# Patient Record
Sex: FEMALE | Race: BLACK OR AFRICAN AMERICAN | URBAN - METROPOLITAN AREA
[De-identification: names, ages, dates, MRNs, and addresses within clinical notes are randomized per-mention and may not be internally consistent; named-entity substitution may affect disease eponyms.]

---

## 2023-04-12 ENCOUNTER — OFFICE VISIT (OUTPATIENT)
Dept: URBAN - METROPOLITAN AREA CLINIC 74 | Facility: CLINIC | Age: 43
End: 2023-04-12
Payer: COMMERCIAL

## 2023-04-12 ENCOUNTER — LAB OUTSIDE AN ENCOUNTER (OUTPATIENT)
Dept: URBAN - METROPOLITAN AREA CLINIC 74 | Facility: CLINIC | Age: 43
End: 2023-04-12

## 2023-04-12 ENCOUNTER — WEB ENCOUNTER (OUTPATIENT)
Dept: URBAN - METROPOLITAN AREA CLINIC 74 | Facility: CLINIC | Age: 43
End: 2023-04-12

## 2023-04-12 VITALS
DIASTOLIC BLOOD PRESSURE: 70 MMHG | HEART RATE: 78 BPM | WEIGHT: 188 LBS | SYSTOLIC BLOOD PRESSURE: 116 MMHG | BODY MASS INDEX: 33.31 KG/M2 | TEMPERATURE: 97.7 F | OXYGEN SATURATION: 99 % | HEIGHT: 63 IN

## 2023-04-12 DIAGNOSIS — K59.04 CHRONIC IDIOPATHIC CONSTIPATION: ICD-10-CM

## 2023-04-12 DIAGNOSIS — K63.89 EPIPLOIC APPENDAGITIS: ICD-10-CM

## 2023-04-12 DIAGNOSIS — K62.5 RECTAL BLEED: ICD-10-CM

## 2023-04-12 PROBLEM — 82934008: Status: ACTIVE | Noted: 2023-04-12

## 2023-04-12 PROCEDURE — 99204 OFFICE O/P NEW MOD 45 MIN: CPT | Performed by: INTERNAL MEDICINE

## 2023-04-12 RX ORDER — LINACLOTIDE 72 UG/1
1 CAPSULE, GELATIN COATED ORAL ONCE A DAY
Qty: 90 | Refills: 3 | OUTPATIENT
Start: 2023-04-12 | End: 2024-04-06

## 2023-04-12 NOTE — HPI-TODAY'S VISIT:
Pt seen in ER 3/19/23 for abd pain, CT showed epiploic appendigitis vs colitis. CBC/CMP/Lipase/UA/Preg negative or normal. ===== Results for orders placed or performed during the hospital encounter of 03/19/23 CT Abdomen/Pelvis with IV Contrast(Creatinine draw if needed)  	Narrative  	EXAM:  PH CT ABDOMEN/PELVIS WITH IV CONTRAST(CREATININE DRAW IF NEEDED)   CLINICAL INDICATION: LLQ pain.   TECHNIQUE: Following IV administration of 99 mL Omnipaque 350, CT scan of the abdomen and pelvis with multiplanar reformatted images generated from the data set. Dose reduction techniques were utilized.    COMPARISON: Pelvic ultrasound 12/1/2022, CT abdomen/pelvis 10/24/2022   FINDINGS:    Lower chest: No evidence of abnormality.   Liver: Normal in appearance.   Gallbladder: Normal.    Pancreas: Normal.    Spleen: Normal.   Adrenals: Normal.   Kidneys/urinary bladder: Normal.    Gastrointestinal: Normal caliber bowel. No bowel obstruction. Very mild inflammatory changes along the distal descending colon surrounding a rounded area of focal fat potentially representing mild epiploic appendagitis (series 201 images 78-84, series  202 images 34-37). Multiple thickening of the proximal to mid descending colon may be due to underdistention. The suspected appendix appears unremarkable.   Pelvis: Leiomyomatous uterus. Possible left adnexal crenated cyst measuring up to 1.6 cm (series 201 image 90) versus a fundal fibroid. Small volume free fluid within the pelvis is noted.   Vasculature: Abdominal and pelvic vasculature appears normal.   Peritoneum/retroperitoneum: No enlarged lymph nodes. No pneumoperitoneum. Mild nonspecific mesenteric haziness within the mid abdomen.   Bones: No destructive osseous lesion.      	Impression  	.         . 1.  Very mild inflammatory changes along the distal descending colon surrounding an area of focal fat potentially representing mild epiploic appendagitis . 2.  Small volume pelvic free fluid may be physiologic. Leiomyomatous uterus.   Released By: JARAD WERNER MD 3/19/2023 2:46 PM       Procedures       Assessment: Impression: 42 y.o. female presents to ED w/ LLQ pain.  Tenderness to palpation.  Diverticulitis v colitis v less likely GYN/urinary etiology. -BMP, CBC, UA -CT ab pelvis -reassess.    Interpreted CT scan:  LLQ inflammatory process adjacent to colon.     Labs reassuring.     Pt to follow up w/ PCP.         Medications Administered in ED    Medications ketorolac (TORADOL) injection 30 mg/mL (15 mg Intravenous Given 3/19/23 1316) dicyclomine (BENTYL) injection 10 mg/mL (20 mg Intramuscular Given 3/19/23 1316) iohexol (OMNIPAQUE) injection 350 mg/mL (99 mL Intravenous Given 3/19/23 1336) sodium chloride (NS) 0.9 % infusion (  Override Pull 3/19/23 1332) diphenhydrAMINE (BENADRYL) injection 50 mg/mL (50 mg Intravenous Given 3/19/23 1351)       ED Final Impression                Final diagnoses: Epiploic appendagitis

## 2023-04-13 ENCOUNTER — TELEPHONE ENCOUNTER (OUTPATIENT)
Dept: URBAN - METROPOLITAN AREA CLINIC 74 | Facility: CLINIC | Age: 43
End: 2023-04-13

## 2023-04-13 RX ORDER — PLECANATIDE 3 MG/1
1 TABLET TABLET ORAL ONCE A DAY
Qty: 90 | Refills: 3 | OUTPATIENT
Start: 2023-04-13 | End: 2024-04-07

## 2023-04-21 ENCOUNTER — TELEPHONE ENCOUNTER (OUTPATIENT)
Dept: URBAN - METROPOLITAN AREA CLINIC 74 | Facility: CLINIC | Age: 43
End: 2023-04-21

## 2023-04-28 ENCOUNTER — TELEPHONE ENCOUNTER (OUTPATIENT)
Dept: URBAN - METROPOLITAN AREA CLINIC 74 | Facility: CLINIC | Age: 43
End: 2023-04-28

## 2023-05-30 ENCOUNTER — OFFICE VISIT (OUTPATIENT)
Dept: URBAN - METROPOLITAN AREA SURGERY CENTER 30 | Facility: SURGERY CENTER | Age: 43
End: 2023-05-30

## 2023-06-21 ENCOUNTER — WEB ENCOUNTER (OUTPATIENT)
Dept: URBAN - METROPOLITAN AREA SURGERY CENTER 30 | Facility: SURGERY CENTER | Age: 43
End: 2023-06-21

## 2023-06-27 ENCOUNTER — OFFICE VISIT (OUTPATIENT)
Dept: URBAN - METROPOLITAN AREA SURGERY CENTER 30 | Facility: SURGERY CENTER | Age: 43
End: 2023-06-27
Payer: COMMERCIAL

## 2023-06-27 DIAGNOSIS — K59.09 COLONIC CONSTIPATION: ICD-10-CM

## 2023-06-27 DIAGNOSIS — R10.84 ABDOMINAL CRAMPING, GENERALIZED: ICD-10-CM

## 2023-06-27 PROCEDURE — G8907 PT DOC NO EVENTS ON DISCHARG: HCPCS | Performed by: INTERNAL MEDICINE

## 2023-06-27 PROCEDURE — 45378 DIAGNOSTIC COLONOSCOPY: CPT | Performed by: INTERNAL MEDICINE

## 2023-06-28 ENCOUNTER — OFFICE VISIT (OUTPATIENT)
Dept: URBAN - METROPOLITAN AREA CLINIC 74 | Facility: CLINIC | Age: 43
End: 2023-06-28

## 2023-07-19 ENCOUNTER — OFFICE VISIT (OUTPATIENT)
Dept: URBAN - METROPOLITAN AREA CLINIC 74 | Facility: CLINIC | Age: 43
End: 2023-07-19

## 2023-07-19 RX ORDER — PLECANATIDE 3 MG/1
1 TABLET TABLET ORAL ONCE A DAY
Qty: 90 | Refills: 3 | Status: ACTIVE | COMMUNITY
Start: 2023-04-13 | End: 2024-04-07

## 2023-07-19 RX ORDER — LINACLOTIDE 72 UG/1
1 CAPSULE, GELATIN COATED ORAL ONCE A DAY
Qty: 90 | Refills: 3 | Status: ACTIVE | COMMUNITY
Start: 2023-04-12 | End: 2024-04-06

## 2023-07-19 RX ORDER — LINACLOTIDE 72 UG/1
1 CAPSULE, GELATIN COATED ORAL ONCE A DAY
Qty: 90 | Refills: 3 | OUTPATIENT

## 2023-07-19 NOTE — HPI-TODAY'S VISIT:
Follow up colonoscopy. Colonoscopy 6/23 negative, small hemorrhoids noted and not banded. Rx: Trulance, started last visit. Pt seen in ER 3/19/23 for abd pain, CT showed epiploic appendigitis vs colitis. CBC/CMP/Lipase/UA/Preg negative or normal. =====

## 2023-09-27 ENCOUNTER — TELEPHONE ENCOUNTER (OUTPATIENT)
Dept: URBAN - METROPOLITAN AREA CLINIC 74 | Facility: CLINIC | Age: 43
End: 2023-09-27

## 2023-12-14 ENCOUNTER — LAB OUTSIDE AN ENCOUNTER (OUTPATIENT)
Dept: URBAN - METROPOLITAN AREA CLINIC 74 | Facility: CLINIC | Age: 43
End: 2023-12-14

## 2023-12-14 ENCOUNTER — OFFICE VISIT (OUTPATIENT)
Dept: URBAN - METROPOLITAN AREA CLINIC 74 | Facility: CLINIC | Age: 43
End: 2023-12-14
Payer: COMMERCIAL

## 2023-12-14 VITALS
HEART RATE: 74 BPM | WEIGHT: 162 LBS | TEMPERATURE: 96.8 F | HEIGHT: 63 IN | BODY MASS INDEX: 28.7 KG/M2 | DIASTOLIC BLOOD PRESSURE: 74 MMHG | SYSTOLIC BLOOD PRESSURE: 132 MMHG

## 2023-12-14 DIAGNOSIS — Z92.89 HISTORY OF VENTRICULOPERITONEAL SHUNTING: ICD-10-CM

## 2023-12-14 DIAGNOSIS — R10.31 RLQ ABDOMINAL PAIN: ICD-10-CM

## 2023-12-14 DIAGNOSIS — K59.04 CHRONIC IDIOPATHIC CONSTIPATION: ICD-10-CM

## 2023-12-14 DIAGNOSIS — K63.89 EPIPLOIC APPENDAGITIS: ICD-10-CM

## 2023-12-14 PROCEDURE — 99213 OFFICE O/P EST LOW 20 MIN: CPT | Performed by: PHYSICIAN ASSISTANT

## 2023-12-14 RX ORDER — LINACLOTIDE 72 UG/1
1 CAPSULE, GELATIN COATED ORAL ONCE A DAY
Qty: 90 | Refills: 3 | Status: ACTIVE | COMMUNITY

## 2023-12-14 RX ORDER — PLECANATIDE 3 MG/1
1 TABLET TABLET ORAL ONCE A DAY
Qty: 90 | Refills: 3 | Status: ACTIVE | COMMUNITY
Start: 2023-04-13 | End: 2024-04-07

## 2023-12-14 NOTE — PHYSICAL EXAM GASTROINTESTINAL
Abdomen , soft, tender at RLQ and pelvic region, nondistended , no guarding or rigidity , no masses palpable , normal bowel sounds , Liver and Spleen,  no hepatosplenomegaly , liver nontender

## 2023-12-18 ENCOUNTER — OFFICE VISIT (OUTPATIENT)
Dept: URBAN - METROPOLITAN AREA CLINIC 40 | Facility: CLINIC | Age: 43
End: 2023-12-18

## 2024-01-11 ENCOUNTER — OFFICE VISIT (OUTPATIENT)
Dept: URBAN - METROPOLITAN AREA CLINIC 74 | Facility: CLINIC | Age: 44
End: 2024-01-11

## 2024-01-11 RX ORDER — PLECANATIDE 3 MG/1
1 TABLET TABLET ORAL ONCE A DAY
Qty: 90 | Refills: 3 | Status: ACTIVE | COMMUNITY
Start: 2023-04-13 | End: 2024-04-07

## 2024-01-11 RX ORDER — LINACLOTIDE 72 UG/1
1 CAPSULE, GELATIN COATED ORAL ONCE A DAY
Qty: 90 | Refills: 3 | Status: ACTIVE | COMMUNITY

## 2024-01-11 NOTE — HPI-TODAY'S VISIT:
The patient is 43-year-old female with past medical history as noted below known to Dr. Robbins is presenting to our clinic today to discuss her imaging results.    -- The patient denies dyspepsia, dysphagia, odynophagia, hemoptysis, hematemesis, nausea, vomiting, regurgitation, melena, constipation, diarrhea, abdominal pain, hematochezia, fever, chills, chest pain, SOB, or any other GI complaints today.  -- The patient denies ETOH, Tobacco, and Illicit drug use.  -- The patient is up to date with Flu and COVID vaccine.  -- Denies NSAID's.    Diagnsotic studies: -- TV US on 12/18/2023 with anteverted uterus contains three fibroids. Endometrium measures 3 mm. Bilateral ovaries are visualized and demonstrate normal blood flow. Large volume pelvic free fluid measures up to 6.7 cm.   -- CT scan on 11/02/2023 with no evidence of abnormality.   -- Labs 11/02/2023 CBC, CMP, and UA unremarkable. Negative UA pregnancy test.    Procedure: -- Colonoscopy on 06/27/2023 by Dr. Robbins noted the entire examined colon is normal.  Internal hemorrhoids.  No specimen collected.

## 2024-01-16 ENCOUNTER — TELEPHONE ENCOUNTER (OUTPATIENT)
Dept: URBAN - METROPOLITAN AREA CLINIC 74 | Facility: CLINIC | Age: 44
End: 2024-01-16

## 2024-02-01 PROBLEM — 54554009: Status: ACTIVE | Noted: 2024-02-01

## 2024-02-02 ENCOUNTER — OV EP (OUTPATIENT)
Dept: URBAN - METROPOLITAN AREA CLINIC 74 | Facility: CLINIC | Age: 44
End: 2024-02-02
Payer: COMMERCIAL

## 2024-02-02 VITALS
SYSTOLIC BLOOD PRESSURE: 134 MMHG | TEMPERATURE: 97.7 F | HEIGHT: 63 IN | BODY MASS INDEX: 29.8 KG/M2 | WEIGHT: 168.2 LBS | HEART RATE: 72 BPM | OXYGEN SATURATION: 97 %

## 2024-02-02 DIAGNOSIS — Z92.89 HISTORY OF VENTRICULOPERITONEAL SHUNTING: ICD-10-CM

## 2024-02-02 DIAGNOSIS — K63.89 EPIPLOIC APPENDAGITIS: ICD-10-CM

## 2024-02-02 DIAGNOSIS — K59.04 CHRONIC IDIOPATHIC CONSTIPATION: ICD-10-CM

## 2024-02-02 DIAGNOSIS — Q45.3 PANCREAS DIVISUM: ICD-10-CM

## 2024-02-02 PROCEDURE — 99213 OFFICE O/P EST LOW 20 MIN: CPT | Performed by: PHYSICIAN ASSISTANT

## 2024-02-02 RX ORDER — PLECANATIDE 3 MG/1
1 TABLET TABLET ORAL ONCE A DAY
Qty: 90 | Refills: 3 | Status: ACTIVE | COMMUNITY
Start: 2023-04-13 | End: 2024-04-07

## 2024-02-02 RX ORDER — LINACLOTIDE 72 UG/1
1 CAPSULE, GELATIN COATED ORAL ONCE A DAY
Qty: 90 | Refills: 3 | Status: DISCONTINUED | COMMUNITY

## 2024-02-02 NOTE — HPI-TODAY'S VISIT:
The patient is 43-year-old female with past medical history as noted below known to Dr. Robbins is presenting to our clinic today to discuss her imaging results. The patient denies any GI isuues today.     -- The patient denies dyspepsia, dysphagia, odynophagia, hemoptysis, hematemesis, nausea, vomiting, regurgitation, melena, constipation, diarrhea, abdominal pain, hematochezia, fever, chills, chest pain, SOB, or any other GI complaints today.  -- The patient denies ETOH, Tobacco, and Illicit drug use.  -- The patient is up to date with Flu and COVID vaccine.  -- Denies NSAID's.    Diagnsotic studies: -- MRe on 01/29/2024 with no findings of active inflammatory bowel disease. A fibroid is seen at the uterine fundus posteriorly measuring 2 cm. Other uterine fibroids are also noted. Pancreas divisum is noted. No main pancreatic duct dilation. No segments of bowel wall thickening or mucosal hyperenhancement to indicate active inflammatory bowel disease.  No disproportionate dilation of the small or large bowel.  No stricture, fistula, sinus tract, or abscess identified.  Motility appears normal.   -- TV US on 12/18/2023 with anteverted uterus contains three fibroids. Endometrium measures 3 mm. Bilateral ovaries are visualized and demonstrate normal blood flow. Large volume pelvic free fluid measures up to 6.7 cm.   Procedure: -- Colonoscopy on 06/27/2023 by Dr. Robbins noted the entire examined colon is normal.  Internal hemorrhoids.  No specimen collected.

## 2024-02-03 LAB
ABSOLUTE BASOPHILS: 11
ABSOLUTE EOSINOPHILS: 19
ABSOLUTE LYMPHOCYTES: 1805
ABSOLUTE MONOCYTES: 296
ABSOLUTE NEUTROPHILS: 1668
ALBUMIN/GLOBULIN RATIO: 1.6
ALBUMIN: 4.3
ALKALINE PHOSPHATASE: 77
ALT: 8
AMYLASE: 68
AST: 12
BASOPHILS: 0.3
BILIRUBIN, TOTAL: 0.6
BUN/CREATININE RATIO: (no result)
CALCIUM: 9.4
CARBON DIOXIDE: 28
CHLORIDE: 102
CREATININE: 0.7
EGFR: 110
EOSINOPHILS: 0.5
FIB 4 INDEX: 0.6
FIB 4 INTERPRETATION: (no result)
GLOBULIN: 2.7
GLUCOSE: 85
HEMATOCRIT: 36.6
HEMOGLOBIN: 12
LIPASE: 38
LYMPHOCYTES: 47.5
MCH: 29.6
MCHC: 32.8
MCV: 90.4
MONOCYTES: 7.8
MPV: 9.5
NEUTROPHILS: 43.9
PLATELET COUNT: 302
PLATELET COUNT: 302
POTASSIUM: 4.3
PROTEIN, TOTAL: 7
RDW: 12.2
RED BLOOD CELL COUNT: 4.05
SODIUM: 138
UREA NITROGEN (BUN): 12
WHITE BLOOD CELL COUNT: 3.8

## 2024-09-19 ENCOUNTER — OFFICE VISIT (OUTPATIENT)
Dept: URBAN - METROPOLITAN AREA CLINIC 74 | Facility: CLINIC | Age: 44
End: 2024-09-19
Payer: COMMERCIAL

## 2024-09-19 ENCOUNTER — DASHBOARD ENCOUNTERS (OUTPATIENT)
Age: 44
End: 2024-09-19

## 2024-09-19 ENCOUNTER — LAB OUTSIDE AN ENCOUNTER (OUTPATIENT)
Dept: URBAN - METROPOLITAN AREA CLINIC 74 | Facility: CLINIC | Age: 44
End: 2024-09-19

## 2024-09-19 VITALS
DIASTOLIC BLOOD PRESSURE: 90 MMHG | TEMPERATURE: 98.6 F | WEIGHT: 179.4 LBS | HEART RATE: 83 BPM | SYSTOLIC BLOOD PRESSURE: 144 MMHG | HEIGHT: 63 IN | BODY MASS INDEX: 31.79 KG/M2

## 2024-09-19 DIAGNOSIS — K63.89 EPIPLOIC APPENDAGITIS: ICD-10-CM

## 2024-09-19 DIAGNOSIS — K59.09 CHANGE IN BOWEL MOVEMENTS INTERMITTENT CONSTIPATION. URGENCY IN THE MORNING.: ICD-10-CM

## 2024-09-19 DIAGNOSIS — R10.13 EPIGASTRIC ABDOMINAL PAIN: ICD-10-CM

## 2024-09-19 DIAGNOSIS — Q45.3 PANCREAS DIVISUM: ICD-10-CM

## 2024-09-19 PROCEDURE — 99214 OFFICE O/P EST MOD 30 MIN: CPT | Performed by: PHYSICIAN ASSISTANT

## 2024-09-19 RX ORDER — PLECANATIDE 3 MG/1
1 TABLET TABLET ORAL ONCE A DAY
Status: ACTIVE | COMMUNITY

## 2024-09-19 RX ORDER — SERTRALINE 50 MG/1
1 TABLET TABLET, FILM COATED ORAL ONCE A DAY
Status: ACTIVE | COMMUNITY

## 2024-09-19 RX ORDER — NIFEDIPINE 10 MG/1
1 CAPSULE AS NEEDED CAPSULE ORAL
Status: ACTIVE | COMMUNITY

## 2024-09-19 RX ORDER — PANTOPRAZOLE SODIUM 40 MG/1
1 TABLET TABLET, DELAYED RELEASE ORAL ONCE A DAY
Qty: 90 TABLET | Refills: 3 | OUTPATIENT
Start: 2024-09-19

## 2024-09-19 RX ORDER — LEVETIRACETAM 500 MG/1
1 TABLET TABLET, FILM COATED, EXTENDED RELEASE ORAL ONCE A DAY
Status: ACTIVE | COMMUNITY

## 2024-09-19 RX ORDER — TRAZODONE HYDROCHLORIDE 50 MG/1
1 TABLET AT BEDTIME AS NEEDED TABLET ORAL ONCE A DAY
Status: ACTIVE | COMMUNITY

## 2024-09-19 RX ORDER — PLECANATIDE 3 MG/1
1 TABLET TABLET ORAL ONCE A DAY
Qty: 90 TABLET | Refills: 3

## 2024-09-19 RX ORDER — RIMEGEPANT SULFATE 75 MG/75MG
TABLET, ORALLY DISINTEGRATING ORAL
Qty: 16 TABLET | Refills: 0 | Status: ACTIVE | COMMUNITY

## 2024-09-19 NOTE — HPI-TODAY'S VISIT:
The patient is 43-year-old female with past medical history as noted below known to Dr. Robbins is presenting to our clinic today for follow up appointment with complain of loose stools. She was treated in the past with Trulance 3 mg for constipation with good response. She has noted with pain and discomfort at epigastric region. She has pain and discomfort after meals with nausea. She did not complete her stool study for Pancreatic elastase. She was seen at Urgent Care for upper abdominal pain and CT scan as noted below. She was advised to take her medication Trulance for constipation daily. No other GI issues today.   Diagnsotic studies: -- CT scan of the abdomen and pelvis without IV contrast on 09/11/2024 no acute findings in the abdomen and pelvis.  Moderate colonic stool burden.  Liver, gallbladder, biliary duct, pancreas, spleen, adrenal glands, and kidneys are unremarkable.  -- Labs on 02/02/2024 CBC, CMP, Amylase, Lipase, and FIB-4 INDEX 0.60.   -- MRe on 01/29/2024 with no findings of active inflammatory bowel disease. A fibroid is seen at the uterine fundus posteriorly measuring 2 cm. Other uterine fibroids are also noted. Pancreas divisum is noted. No main pancreatic duct dilation. No segments of bowel wall thickening or mucosal hyperenhancement to indicate active inflammatory bowel disease.  No disproportionate dilation of the small or large bowel.  No stricture, fistula, sinus tract, or abscess identified.  Motility appears normal.   -- TV US on 12/18/2023 with anteverted uterus contains three fibroids. Endometrium measures 3 mm. Bilateral ovaries are visualized and demonstrate normal blood flow. Large volume pelvic free fluid measures up to 6.7 cm.   Procedure: -- Colonoscopy on 06/27/2023 by Dr. Robbins noted the entire examined colon is normal.  Internal hemorrhoids.  No specimen collected.

## 2024-09-24 LAB — PANCREATICELASTASE ELISA, STOOL: (no result)

## 2024-10-23 ENCOUNTER — TELEPHONE ENCOUNTER (OUTPATIENT)
Dept: URBAN - METROPOLITAN AREA CLINIC 74 | Facility: CLINIC | Age: 44
End: 2024-10-23

## 2024-12-24 ENCOUNTER — CLAIMS CREATED FROM THE CLAIM WINDOW (OUTPATIENT)
Dept: URBAN - METROPOLITAN AREA SURGERY CENTER 30 | Facility: SURGERY CENTER | Age: 44
End: 2024-12-24

## 2024-12-24 ENCOUNTER — CLAIMS CREATED FROM THE CLAIM WINDOW (OUTPATIENT)
Dept: URBAN - METROPOLITAN AREA CLINIC 4 | Facility: CLINIC | Age: 44
End: 2024-12-24
Payer: COMMERCIAL

## 2024-12-24 DIAGNOSIS — K31.89 OTHER DISEASES OF STOMACH AND DUODENUM: ICD-10-CM

## 2024-12-24 PROCEDURE — 88305 TISSUE EXAM BY PATHOLOGIST: CPT | Performed by: PATHOLOGY

## 2024-12-24 RX ORDER — PLECANATIDE 3 MG/1
1 TABLET TABLET ORAL ONCE A DAY
Qty: 90 TABLET | Refills: 3 | Status: ACTIVE | COMMUNITY

## 2024-12-24 RX ORDER — RIMEGEPANT SULFATE 75 MG/75MG
TABLET, ORALLY DISINTEGRATING ORAL
Qty: 16 TABLET | Refills: 0 | Status: ACTIVE | COMMUNITY

## 2024-12-24 RX ORDER — NIFEDIPINE 10 MG/1
1 CAPSULE AS NEEDED CAPSULE ORAL
Status: ACTIVE | COMMUNITY

## 2024-12-24 RX ORDER — SERTRALINE 50 MG/1
1 TABLET TABLET, FILM COATED ORAL ONCE A DAY
Status: ACTIVE | COMMUNITY

## 2024-12-24 RX ORDER — LEVETIRACETAM 500 MG/1
1 TABLET TABLET, FILM COATED, EXTENDED RELEASE ORAL ONCE A DAY
Status: ACTIVE | COMMUNITY

## 2024-12-24 RX ORDER — PANTOPRAZOLE SODIUM 40 MG/1
1 TABLET TABLET, DELAYED RELEASE ORAL ONCE A DAY
Qty: 90 TABLET | Refills: 3 | Status: ACTIVE | COMMUNITY
Start: 2024-09-19

## 2024-12-24 RX ORDER — TRAZODONE HYDROCHLORIDE 50 MG/1
1 TABLET AT BEDTIME AS NEEDED TABLET ORAL ONCE A DAY
Status: ACTIVE | COMMUNITY

## 2025-01-08 ENCOUNTER — TELEPHONE ENCOUNTER (OUTPATIENT)
Dept: URBAN - METROPOLITAN AREA CLINIC 74 | Facility: CLINIC | Age: 45
End: 2025-01-08

## 2025-01-13 ENCOUNTER — OFFICE VISIT (OUTPATIENT)
Dept: URBAN - METROPOLITAN AREA TELEHEALTH 2 | Facility: TELEHEALTH | Age: 45
End: 2025-01-13
Payer: COMMERCIAL

## 2025-01-13 DIAGNOSIS — K63.89 EPIPLOIC APPENDAGITIS: ICD-10-CM

## 2025-01-13 DIAGNOSIS — K59.04 CHRONIC IDIOPATHIC CONSTIPATION: ICD-10-CM

## 2025-01-13 DIAGNOSIS — K21.9 CHRONIC GERD WITHOUT ESOPHAGITIS: ICD-10-CM

## 2025-01-13 DIAGNOSIS — Q45.3 PANCREAS DIVISUM: ICD-10-CM

## 2025-01-13 PROCEDURE — 98012 SYNCH AUDIO-ONLY EST SF 10: CPT | Performed by: PHYSICIAN ASSISTANT

## 2025-01-13 PROCEDURE — 98004 SYNCH AUDIO-VIDEO EST SF 10: CPT | Performed by: PHYSICIAN ASSISTANT

## 2025-01-13 PROCEDURE — 99441 PHONE E/M BY PHYS 5-10 MIN: CPT | Performed by: PHYSICIAN ASSISTANT

## 2025-01-13 RX ORDER — PANTOPRAZOLE SODIUM 40 MG/1
1 TABLET TABLET, DELAYED RELEASE ORAL ONCE A DAY
Qty: 90 TABLET | Refills: 3
Start: 2024-09-19

## 2025-01-13 RX ORDER — PLECANATIDE 3 MG/1
1 TABLET TABLET ORAL ONCE A DAY
Qty: 90 TABLET | Refills: 3
End: 2026-01-08

## 2025-01-13 RX ORDER — TRAZODONE HYDROCHLORIDE 50 MG/1
1 TABLET AT BEDTIME AS NEEDED TABLET ORAL ONCE A DAY
Status: ACTIVE | COMMUNITY

## 2025-01-13 RX ORDER — PLECANATIDE 3 MG/1
1 TABLET TABLET ORAL ONCE A DAY
Qty: 90 TABLET | Refills: 3 | Status: ACTIVE | COMMUNITY

## 2025-01-13 RX ORDER — LEVETIRACETAM 500 MG/1
1 TABLET TABLET, FILM COATED, EXTENDED RELEASE ORAL ONCE A DAY
Status: ACTIVE | COMMUNITY

## 2025-01-13 RX ORDER — SERTRALINE 50 MG/1
1 TABLET TABLET, FILM COATED ORAL ONCE A DAY
Status: ACTIVE | COMMUNITY

## 2025-01-13 RX ORDER — RIMEGEPANT SULFATE 75 MG/75MG
TABLET, ORALLY DISINTEGRATING ORAL
Qty: 16 TABLET | Refills: 0 | Status: ACTIVE | COMMUNITY

## 2025-01-13 RX ORDER — NIFEDIPINE 10 MG/1
1 CAPSULE AS NEEDED CAPSULE ORAL
Status: ACTIVE | COMMUNITY

## 2025-01-13 RX ORDER — PANTOPRAZOLE SODIUM 40 MG/1
1 TABLET TABLET, DELAYED RELEASE ORAL ONCE A DAY
Qty: 90 TABLET | Refills: 3 | Status: ACTIVE | COMMUNITY
Start: 2024-09-19

## 2025-01-13 NOTE — HPI-TODAY'S VISIT:
The patient is 44-year-old female with past medical history as noted below known to Dr. Robbins telemedicine follow up appointment to discuss her EGD results. She continues on Pantoprazole 40 mg once daily and Trulance 3 mg once daily not as needed. She is doing well and she denies any GI issues today.    Diagnsotic studies: -- Stool study on 09/19/2024 GPP, WBC, and pancreatic elastase with unremarkable results.  -- CT scan of the abdomen and pelvis without IV contrast on 09/11/2024 no acute findings in the abdomen and pelvis.  Moderate colonic stool burden.  Liver, gallbladder, biliary duct, pancreas, spleen, adrenal glands, and kidneys are unremarkable.  -- Labs on 02/02/2024 CBC, CMP, Amylase, Lipase, and FIB-4 INDEX 0.60.   -- MRe on 01/29/2024 with no findings of active inflammatory bowel disease. A fibroid is seen at the uterine fundus posteriorly measuring 2 cm. Other uterine fibroids are also noted. Pancreas divisum is noted. No main pancreatic duct dilation. No segments of bowel wall thickening or mucosal hyperenhancement to indicate active inflammatory bowel disease.  No disproportionate dilation of the small or large bowel.  No stricture, fistula, sinus tract, or abscess identified.  Motility appears normal.   -- TV US on 12/18/2023 with anteverted uterus contains three fibroids. Endometrium measures 3 mm. Bilateral ovaries are visualized and demonstrate normal blood flow. Large volume pelvic free fluid measures up to 6.7 cm.   Procedures: -- EGD with biopsy on 12/20/2024 by Dr. Robbins noted normal esophagus.  Normal stomach.  Normal examined duodenum.  Biopsy with no significant abnormality.  -- Colonoscopy on 06/27/2023 by Dr. Robbins noted the entire examined colon is normal.  Internal hemorrhoids.  No specimen collected.